# Patient Record
Sex: MALE | Race: NATIVE HAWAIIAN OR OTHER PACIFIC ISLANDER | NOT HISPANIC OR LATINO | URBAN - METROPOLITAN AREA
[De-identification: names, ages, dates, MRNs, and addresses within clinical notes are randomized per-mention and may not be internally consistent; named-entity substitution may affect disease eponyms.]

---

## 2018-11-16 ENCOUNTER — NEW PATIENT (OUTPATIENT)
Dept: URBAN - METROPOLITAN AREA CLINIC 64 | Facility: CLINIC | Age: 28
End: 2018-11-16
Payer: COMMERCIAL

## 2018-11-16 DIAGNOSIS — H16.223 KERATOCONJUNCTIVITIS SICCA, BILATERAL: Primary | ICD-10-CM

## 2018-11-16 PROCEDURE — 92002 INTRM OPH EXAM NEW PATIENT: CPT | Performed by: OPTOMETRIST

## 2018-11-16 ASSESSMENT — INTRAOCULAR PRESSURE
OS: 12
OD: 13

## 2018-11-16 ASSESSMENT — KERATOMETRY
OD: 43.52
OS: 44.06

## 2019-04-02 ENCOUNTER — FOLLOW UP ESTABLISHED (OUTPATIENT)
Dept: URBAN - METROPOLITAN AREA CLINIC 66 | Facility: CLINIC | Age: 29
End: 2019-04-02

## 2019-04-02 ASSESSMENT — VISUAL ACUITY
OD: 20/20
OS: 20/20

## 2019-04-02 ASSESSMENT — INTRAOCULAR PRESSURE
OS: 16
OD: 16

## 2019-04-02 ASSESSMENT — KERATOMETRY
OS: 43.38
OD: 43.25

## 2019-04-30 ENCOUNTER — FOLLOW UP ESTABLISHED (OUTPATIENT)
Dept: URBAN - METROPOLITAN AREA CLINIC 64 | Facility: CLINIC | Age: 29
End: 2019-04-30
Payer: COMMERCIAL

## 2019-04-30 DIAGNOSIS — H52.13 MYOPIA, BILATERAL: Primary | ICD-10-CM

## 2019-04-30 PROCEDURE — 92310 CONTACT LENS FITTING OU: CPT | Performed by: OPTOMETRIST

## 2019-04-30 PROCEDURE — 92015 DETERMINE REFRACTIVE STATE: CPT | Performed by: OPTOMETRIST

## 2019-04-30 PROCEDURE — 92014 COMPRE OPH EXAM EST PT 1/>: CPT | Performed by: OPTOMETRIST

## 2019-04-30 ASSESSMENT — KERATOMETRY
OS: 43.44
OD: 43.24

## 2019-04-30 ASSESSMENT — VISUAL ACUITY
OS: 20/20
OD: 20/20

## 2019-04-30 ASSESSMENT — INTRAOCULAR PRESSURE
OS: 13
OD: 11

## 2020-08-13 ENCOUNTER — FOLLOW UP ESTABLISHED (OUTPATIENT)
Dept: URBAN - METROPOLITAN AREA CLINIC 64 | Facility: CLINIC | Age: 30
End: 2020-08-13
Payer: COMMERCIAL

## 2020-08-13 DIAGNOSIS — Q12.0 CONGENITAL CATARACT: ICD-10-CM

## 2020-08-13 PROCEDURE — 92015 DETERMINE REFRACTIVE STATE: CPT | Performed by: OPTOMETRIST

## 2020-08-13 PROCEDURE — 92014 COMPRE OPH EXAM EST PT 1/>: CPT | Performed by: OPTOMETRIST

## 2020-08-13 PROCEDURE — 92310 CONTACT LENS FITTING OU: CPT | Performed by: OPTOMETRIST

## 2020-08-13 ASSESSMENT — KERATOMETRY
OS: 43.16
OD: 42.86

## 2020-08-13 ASSESSMENT — INTRAOCULAR PRESSURE
OS: 15
OD: 17

## 2020-08-13 ASSESSMENT — VISUAL ACUITY
OD: 20/20
OS: 20/20

## 2021-07-09 ENCOUNTER — OFFICE VISIT (OUTPATIENT)
Dept: URBAN - METROPOLITAN AREA CLINIC 64 | Facility: CLINIC | Age: 31
End: 2021-07-09
Payer: COMMERCIAL

## 2021-07-09 PROCEDURE — 92014 COMPRE OPH EXAM EST PT 1/>: CPT | Performed by: OPTOMETRIST

## 2021-07-09 ASSESSMENT — INTRAOCULAR PRESSURE
OS: 12
OD: 12

## 2021-07-09 ASSESSMENT — KERATOMETRY
OD: 42.88
OS: 43.22

## 2021-07-09 NOTE — IMPRESSION/PLAN
Impression: Myopia, bilateral: H52.13. Plan: Blurry vision with recently made glasses from Rx last Aug.  Glasses Rx was made correctly. Trialed -0.25 OU over the top and improvement noticed. Remake glasses. Also trials given in high Rx for CLs to match glasses.

## 2023-08-07 ENCOUNTER — APPOINTMENT (RX ONLY)
Dept: URBAN - METROPOLITAN AREA CLINIC 140 | Facility: CLINIC | Age: 33
Setting detail: DERMATOLOGY
End: 2023-08-07

## 2023-08-07 DIAGNOSIS — L82.1 OTHER SEBORRHEIC KERATOSIS: ICD-10-CM

## 2023-08-07 PROCEDURE — ? COUNSELING

## 2023-08-07 PROCEDURE — 99202 OFFICE O/P NEW SF 15 MIN: CPT

## 2023-08-07 ASSESSMENT — LOCATION DETAILED DESCRIPTION DERM
LOCATION DETAILED: RIGHT ANTERIOR DISTAL THIGH
LOCATION DETAILED: LEFT PROXIMAL POSTERIOR UPPER ARM

## 2023-08-07 ASSESSMENT — LOCATION SIMPLE DESCRIPTION DERM
LOCATION SIMPLE: LEFT UPPER ARM
LOCATION SIMPLE: RIGHT THIGH

## 2023-08-07 ASSESSMENT — LOCATION ZONE DERM
LOCATION ZONE: ARM
LOCATION ZONE: LEG